# Patient Record
Sex: MALE | Race: WHITE | NOT HISPANIC OR LATINO | Employment: OTHER | URBAN - METROPOLITAN AREA
[De-identification: names, ages, dates, MRNs, and addresses within clinical notes are randomized per-mention and may not be internally consistent; named-entity substitution may affect disease eponyms.]

---

## 2017-01-12 ENCOUNTER — OFFICE VISIT (OUTPATIENT)
Dept: GASTROENTEROLOGY | Facility: CLINIC | Age: 77
End: 2017-01-12

## 2017-01-12 VITALS — HEIGHT: 66 IN | WEIGHT: 166.6 LBS | BODY MASS INDEX: 26.78 KG/M2

## 2017-01-12 DIAGNOSIS — K59.09 OTHER CONSTIPATION: Primary | ICD-10-CM

## 2017-01-12 PROCEDURE — 99214 OFFICE O/P EST MOD 30 MIN: CPT | Performed by: INTERNAL MEDICINE

## 2017-01-12 NOTE — PROGRESS NOTES
Chief Complaint   Patient presents with   • Abdominal Pain   • Constipation       Festus Mcneill is a  76 y.o. male here for a follow up visit for constipation.    HPI    Patient 76-year-old male with history of arthritis colon resection for diverticulitis GERD and internal hemorrhoids.  Patient had a sigmoid resection with diverticulitis and abscess in May 2015.  Patient reports afterwards progressive constipation issues with abdominal pain and borborygmi.  Patient reports constipation never seems to truly improve.  Patient will take enough medication to cause diarrhea which gives him some relief but never feels like he is completely relieved.  Patient now for further evaluation.    Past Medical History   Diagnosis Date   • Arthritis    • Diverticulitis    • Diverticulosis    • GERD (gastroesophageal reflux disease)    • Internal hemorrhoids    • Small bowel obstruction          Current Outpatient Prescriptions:   •  esomeprazole (NexIUM) 40 MG capsule, Take 40 mg by mouth every morning before breakfast., Disp: , Rfl:   •  hydrochlorothiazide (HYDRODIURIL) 25 MG tablet, Take 25 mg by mouth daily., Disp: , Rfl:   •  meloxicam (MOBIC) 15 MG tablet, Take 15 mg by mouth daily., Disp: , Rfl:   •  Multiple Vitamin (MULTI VITAMIN DAILY PO), Take  by mouth., Disp: , Rfl:   •  econazole nitrate (SPECTAZOLE) 1 % cream, Apply  topically daily., Disp: , Rfl:   •  hyoscyamine (LEVSIN) 0.125 MG SL tablet, Take 1 tablet by mouth 4 (four) times a day with meals and nightly., Disp: 120 tablet, Rfl: 5  •  linaclotide (LINZESS) 290 MCG capsule capsule, Take 290 mcg by mouth Daily As Needed (constipation)., Disp: 30 capsule, Rfl: 11  •  mupirocin (BACTROBAN) 2 % ointment, Apply  topically 3 (three) times a day., Disp: , Rfl:     Allergies   Allergen Reactions   • Ciprofloxacin    • Penicillins        Social History     Social History   • Marital status:      Spouse name: N/A   • Number of children: N/A   • Years of  education: N/A     Occupational History   • Not on file.     Social History Main Topics   • Smoking status: Never Smoker   • Smokeless tobacco: Not on file   • Alcohol use No   • Drug use: No   • Sexual activity: Not on file     Other Topics Concern   • Not on file     Social History Narrative       Family History   Problem Relation Age of Onset   • Colon cancer Mother        Review of Systems   Constitutional: Negative.    Respiratory: Negative.    Cardiovascular: Negative.    Gastrointestinal: Negative.    Endocrine: Negative.    Musculoskeletal: Negative.    Skin: Negative.        There were no vitals filed for this visit.    Physical Exam   Constitutional: He is oriented to person, place, and time. He appears well-developed and well-nourished.   HENT:   Head: Normocephalic and atraumatic.   Eyes: EOM are normal. Pupils are equal, round, and reactive to light.   Cardiovascular: Normal rate, regular rhythm and normal heart sounds.    Pulmonary/Chest: Effort normal.   Abdominal: Soft. Bowel sounds are normal. He exhibits no distension and no mass. There is no tenderness. No hernia.   Musculoskeletal: Normal range of motion.   Neurological: He is alert and oriented to person, place, and time.   Skin: Skin is dry.   Psychiatric: He has a normal mood and affect. His behavior is normal. Judgment and thought content normal.       No visits with results within 2 Month(s) from this visit.  Latest known visit with results is:    Admission on 05/05/2015, Discharged on 05/19/2015   No results displayed because visit has over 200 results.          Festus was seen today for abdominal pain and constipation.    Diagnoses and all orders for this visit:    Other constipation  -     Case Request; Standing  -     Case Request    Other orders  -     Implement Anesthesia orders day of procedure.; Standing  -     Obtain informed consent; Standing  -     linaclotide (LINZESS) 290 MCG capsule capsule; Take 290 mcg by mouth Daily As  Needed (constipation).      Patient reports chronic constipation since his sigmoid resection.  Patient postop had some ileus but otherwise was unremarkable now almost 2 years postop patient's constipation bleeds to either severe stool retention or diarrhea if enough meds are taken.  Concern is for possible anastomotic stenosis.  At this point would recommend sigmoidoscopy to evaluate the anastomosis and consider further therapy.  At this point patient reports the Linzess was best which gave him diarrhea and some relief when he did go to the bathroom.  We'll continue on the Linzess as needed and take the sigmoidoscopy when available for further recommendations.

## 2017-01-12 NOTE — MR AVS SNAPSHOT
Festus Mcneill   1/12/2017 2:00 PM   Office Visit    Dept Phone:  [phone]   Encounter #:  [id number]    Provider:  Foster Engel MD   Department:  [facility] GASTROENTEROLOGY                Your Full Care Plan              Today's Medication Changes          These changes are accurate as of: 1/12/17  3:15 PM.  If you have any questions, ask your nurse or doctor.               New Medication(s)Ordered:     linaclotide 290 MCG capsule capsule   Commonly known as:  LINZESS   Take 290 mcg by mouth Daily As Needed (constipation).   Started by:  Foster Engel MD         Stop taking medication(s)listed here:     lubiprostone 24 MCG capsule   Commonly known as:  AMITIZA   Stopped by:  Foster Engel MD                Where to Get Your Medications      These medications were sent to [facility] [address] [place] [address] [address] [facility] AT [facility] - [phone] [facility] [phone] [address] [address]     Phone:  [phone]     linaclotide 290 MCG capsule capsule                  Your Updated Medication List          This list is accurate as of: 1/12/17  3:15 PM.  Always use your most recent med list.                econazole nitrate 1 % cream   Commonly known as:  SPECTAZOLE       esomeprazole 40 MG capsule   Commonly known as:  nexIUM       hydrochlorothiazide 25 MG tablet   Commonly known as:  HYDRODIURIL       hyoscyamine 0.125 MG SL tablet   Commonly known as:  LEVSIN   Take 1 tablet by mouth 4 (four) times a day with meals and nightly.       linaclotide 290 MCG capsule capsule   Commonly known as:  LINZESS   Take 290 mcg by mouth Daily As Needed (constipation).       meloxicam 15 MG tablet   Commonly known as:  MOBIC       MULTI VITAMIN DAILY PO       mupirocin 2 % ointment   Commonly known as:  BACTROBAN               We Performed the Following     Case Request       You Were Diagnosed  "With        Codes Comments    Other constipation    -  Primary ICD-10-CM: K59.09  ICD-9-CM: 564.09       Instructions     None    Patient Instructions History      Upcoming Appointments     Visit Type Date Time Department    FOLLOW UP 2017  2:00 PM [facility] [place]      [employer] Signup     [facility] allows you to send messages to your doctor, view your test results, renew your prescriptions, schedule appointments, and more. To sign up, go to [employer] and click on the Sign Up Now link in the New User? box. Enter your [employer] Activation Code exactly as it appears below along with the last four digits of your Social Security Number and your Date of Birth () to complete the sign-up process. If you do not sign up before the expiration date, you must request a new code.    [employer] Activation Code: [email or web address]  Expires: 2017  3:15 PM    If you have questions, you can email [email or web address] or call [phone] to talk to our [employer] staff. Remember, [employer] is NOT to be used for urgent needs. For medical emergencies, dial 911.               Other Info from Your Visit           Allergies     Ciprofloxacin      Penicillins        Reason for Visit     Abdominal Pain     Constipation           Vital Signs     Height Weight Body Mass Index Smoking Status          66\" (167.6 cm) 166 lb 9.6 oz (75.6 kg) 26.89 kg/m2 Never Smoker        Problems and Diagnoses Noted     Constipation    -  Primary        "

## 2017-01-12 NOTE — LETTER
January 12, 2017     TONE Snyder  [address] [facility] [address]    Patient: Festus Mcneill   YOB: 1940   Date of Visit: 1/12/2017       Dear TONE Mott:    Festus Mcneill was in my office today. Below is a copy of my note.    If you have questions, please do not hesitate to call me. I look forward to following Festus along with you.         Sincerely,        Foster Engel MD        CC: No Known Provider    Chief Complaint   Patient presents with   • Abdominal Pain   • Constipation       Festus Mcneill is a  76 y.o. male here for a follow up visit for constipation.    HPI    Patient 76-year-old male with history of arthritis colon resection for diverticulitis GERD and internal hemorrhoids.  Patient had a sigmoid resection with diverticulitis and abscess in May 2015.  Patient reports afterwards progressive constipation issues with abdominal pain and borborygmi.  Patient reports constipation never seems to truly improve.  Patient will take enough medication to cause diarrhea which gives him some relief but never feels like he is completely relieved.  Patient now for further evaluation.    Past Medical History   Diagnosis Date   • Arthritis    • Diverticulitis    • Diverticulosis    • GERD (gastroesophageal reflux disease)    • Internal hemorrhoids    • Small bowel obstruction          Current Outpatient Prescriptions:   •  esomeprazole (NexIUM) 40 MG capsule, Take 40 mg by mouth every morning before breakfast., Disp: , Rfl:   •  hydrochlorothiazide (HYDRODIURIL) 25 MG tablet, Take 25 mg by mouth daily., Disp: , Rfl:   •  meloxicam (MOBIC) 15 MG tablet, Take 15 mg by mouth daily., Disp: , Rfl:   •  Multiple Vitamin (MULTI VITAMIN DAILY PO), Take  by mouth., Disp: , Rfl:   •  econazole nitrate (SPECTAZOLE) 1 % cream, Apply  topically daily., Disp: , Rfl:   •  hyoscyamine (LEVSIN) 0.125 MG SL tablet, Take 1 tablet by mouth 4 (four) times a day with meals and  nightly., Disp: 120 tablet, Rfl: 5  •  linaclotide (LINZESS) 290 MCG capsule capsule, Take 290 mcg by mouth Daily As Needed (constipation)., Disp: 30 capsule, Rfl: 11  •  mupirocin (BACTROBAN) 2 % ointment, Apply  topically 3 (three) times a day., Disp: , Rfl:     Allergies   Allergen Reactions   • Ciprofloxacin    • Penicillins        Social History     Social History   • Marital status:      Spouse name: N/A   • Number of children: N/A   • Years of education: N/A     Occupational History   • Not on file.     Social History Main Topics   • Smoking status: Never Smoker   • Smokeless tobacco: Not on file   • Alcohol use No   • Drug use: No   • Sexual activity: Not on file     Other Topics Concern   • Not on file     Social History Narrative       Family History   Problem Relation Age of Onset   • Colon cancer Mother        Review of Systems   Constitutional: Negative.    Respiratory: Negative.    Cardiovascular: Negative.    Gastrointestinal: Negative.    Endocrine: Negative.    Musculoskeletal: Negative.    Skin: Negative.        There were no vitals filed for this visit.    Physical Exam   Constitutional: He is oriented to person, place, and time. He appears well-developed and well-nourished.   HENT:   Head: Normocephalic and atraumatic.   Eyes: EOM are normal. Pupils are equal, round, and reactive to light.   Cardiovascular: Normal rate, regular rhythm and normal heart sounds.    Pulmonary/Chest: Effort normal.   Abdominal: Soft. Bowel sounds are normal. He exhibits no distension and no mass. There is no tenderness. No hernia.   Musculoskeletal: Normal range of motion.   Neurological: He is alert and oriented to person, place, and time.   Skin: Skin is dry.   Psychiatric: He has a normal mood and affect. His behavior is normal. Judgment and thought content normal.       No visits with results within 2 Month(s) from this visit.  Latest known visit with results is:    Admission on 05/05/2015, Discharged on  05/19/2015   No results displayed because visit has over 200 results.          Festus was seen today for abdominal pain and constipation.    Diagnoses and all orders for this visit:    Other constipation  -     Case Request; Standing  -     Case Request    Other orders  -     Implement Anesthesia orders day of procedure.; Standing  -     Obtain informed consent; Standing  -     linaclotide (LINZESS) 290 MCG capsule capsule; Take 290 mcg by mouth Daily As Needed (constipation).      Patient reports chronic constipation since his sigmoid resection.  Patient postop had some ileus but otherwise was unremarkable now almost 2 years postop patient's constipation bleeds to either severe stool retention or diarrhea if enough meds are taken.  Concern is for possible anastomotic stenosis.  At this point would recommend sigmoidoscopy to evaluate the anastomosis and consider further therapy.  At this point patient reports the Linzess was best which gave him diarrhea and some relief when he did go to the bathroom.  We'll continue on the Linzess as needed and take the sigmoidoscopy when available for further recommendations.

## 2017-01-23 ENCOUNTER — ANESTHESIA EVENT (OUTPATIENT)
Dept: GASTROENTEROLOGY | Facility: HOSPITAL | Age: 77
End: 2017-01-23

## 2017-01-23 ENCOUNTER — HOSPITAL ENCOUNTER (OUTPATIENT)
Facility: HOSPITAL | Age: 77
Setting detail: HOSPITAL OUTPATIENT SURGERY
Discharge: HOME | End: 2017-01-23
Attending: INTERNAL MEDICINE | Admitting: INTERNAL MEDICINE

## 2017-01-23 ENCOUNTER — ANESTHESIA (OUTPATIENT)
Dept: GASTROENTEROLOGY | Facility: HOSPITAL | Age: 77
End: 2017-01-23

## 2017-01-23 VITALS
SYSTOLIC BLOOD PRESSURE: 145 MMHG | RESPIRATION RATE: 16 BRPM | BODY MASS INDEX: 26.68 KG/M2 | DIASTOLIC BLOOD PRESSURE: 86 MMHG | HEART RATE: 70 BPM | HEIGHT: 66 IN | WEIGHT: 166 LBS | OXYGEN SATURATION: 95 %

## 2017-01-23 PROCEDURE — 25010000002 PROPOFOL 10 MG/ML EMULSION

## 2017-01-23 PROCEDURE — 45330 DIAGNOSTIC SIGMOIDOSCOPY: CPT | Performed by: INTERNAL MEDICINE

## 2017-01-23 RX ORDER — LIDOCAINE HYDROCHLORIDE 20 MG/ML
INJECTION, SOLUTION INFILTRATION; PERINEURAL AS NEEDED
Status: DISCONTINUED | OUTPATIENT
Start: 2017-01-23 | End: 2017-01-23 | Stop reason: SURG

## 2017-01-23 RX ORDER — SODIUM CHLORIDE, SODIUM LACTATE, POTASSIUM CHLORIDE, CALCIUM CHLORIDE 600; 310; 30; 20 MG/100ML; MG/100ML; MG/100ML; MG/100ML
30 INJECTION, SOLUTION INTRAVENOUS CONTINUOUS PRN
Status: DISCONTINUED | OUTPATIENT
Start: 2017-01-23 | End: 2017-01-23 | Stop reason: HOSPADM

## 2017-01-23 RX ORDER — PROPOFOL 10 MG/ML
VIAL (ML) INTRAVENOUS AS NEEDED
Status: DISCONTINUED | OUTPATIENT
Start: 2017-01-23 | End: 2017-01-23 | Stop reason: SURG

## 2017-01-23 RX ORDER — PROPOFOL 10 MG/ML
VIAL (ML) INTRAVENOUS CONTINUOUS PRN
Status: DISCONTINUED | OUTPATIENT
Start: 2017-01-23 | End: 2017-01-23 | Stop reason: SURG

## 2017-01-23 RX ADMIN — PROPOFOL 140 MCG/KG/MIN: 10 INJECTION, EMULSION INTRAVENOUS at 14:03

## 2017-01-23 RX ADMIN — SODIUM CHLORIDE, POTASSIUM CHLORIDE, SODIUM LACTATE AND CALCIUM CHLORIDE 30 ML/HR: 600; 310; 30; 20 INJECTION, SOLUTION INTRAVENOUS at 13:18

## 2017-01-23 RX ADMIN — PROPOFOL 60 MG: 10 INJECTION, EMULSION INTRAVENOUS at 14:05

## 2017-01-23 RX ADMIN — LIDOCAINE HYDROCHLORIDE 40 MG: 20 INJECTION, SOLUTION INFILTRATION; PERINEURAL at 14:03

## 2017-01-23 NOTE — IP AVS SNAPSHOT
AFTER VISIT SUMMARY             Festus Mcneill           About your hospitalization     You were admitted on:  January 23, 2017 You last received care in the:  [facility] END[facility]       Procedures & Surgeries      Procedure(s) (LRB):  SIGMOIDOSCOPY FLEXIBLE  TO LEFT TRANSVERSE COLON (N/A)     1/23/2017     Surgeon(s):  Patsy Gallegos MD  -------------------      Medications    If you or your caregiver advised us that you are currently taking a medication and that medication is marked below as “Resume”, this simply indicates that we have reviewed those medications to make sure our new therapy recommendations do not interfere.  If you have concerns about medications other than those new ones which we are prescribing today, please consult the physician who prescribed them (or your primary physician).  Our review of your home medications is not meant to indicate that we are directing their use.             Your Medications      CONTINUE taking these medications     econazole nitrate 1 % cream   Apply  topically daily.   Commonly known as:  SPECTAZOLE           esomeprazole 40 MG capsule   Take 40 mg by mouth every morning before breakfast.   Commonly known as:  nexIUM           hydrochlorothiazide 25 MG tablet   Take 25 mg by mouth daily.   Commonly known as:  HYDRODIURIL           hyoscyamine 0.125 MG SL tablet   Take 1 tablet by mouth 4 (four) times a day with meals and nightly.   Commonly known as:  LEVSIN           linaclotide 290 MCG capsule capsule   Take 290 mcg by mouth Daily As Needed (constipation).   Commonly known as:  LINZESS           meloxicam 15 MG tablet   Take 15 mg by mouth daily.   Commonly known as:  MOBIC           MULTI VITAMIN DAILY PO   Take  by mouth.           mupirocin 2 % ointment   Apply  topically 3 (three) times a day.   Commonly known as:  BACTROBAN                      Your Medications      Your Medication List           Morning Noon Evening Bedtime As  Needed    econazole nitrate 1 % cream   Apply  topically daily.   Commonly known as:  SPECTAZOLE                                esomeprazole 40 MG capsule   Take 40 mg by mouth every morning before breakfast.   Commonly known as:  nexIUM                                hydrochlorothiazide 25 MG tablet   Take 25 mg by mouth daily.   Commonly known as:  HYDRODIURIL                                hyoscyamine 0.125 MG SL tablet   Take 1 tablet by mouth 4 (four) times a day with meals and nightly.   Commonly known as:  LEVSIN                                linaclotide 290 MCG capsule capsule   Take 290 mcg by mouth Daily As Needed (constipation).   Commonly known as:  LINZESS                                meloxicam 15 MG tablet   Take 15 mg by mouth daily.   Commonly known as:  MOBIC                                MULTI VITAMIN DAILY PO   Take  by mouth.                                mupirocin 2 % ointment   Apply  topically 3 (three) times a day.   Commonly known as:  BACTROBAN                                         Instructions for After Discharge        Discharge References/Attachments     DIVERTICULOSIS (ENGLISH)    FLEXIBLE SIGMOIDOSCOPY, CARE AFTER (ENGLISH)    LINACLOTIDE ORAL CAPSULES (ENGLISH)    HEMORRHOIDS, EASY-TO-READ (ENGLISH)       Follow-ups for After Discharge        SureDone Signup     [facility] SureDone allows you to send messages to your doctor, view your test results, renew your prescriptions, schedule appointments, and more. To sign up, go to [employer] and click on the Sign Up Now link in the New User? box. Enter your SureDone Activation Code exactly as it appears below along with the last four digits of your Social Security Number and your Date of Birth () to complete the sign-up process. If you do not sign up before the expiration date, you must request a new code.    SureDone Activation Code: [email or web address]  Expires: 2017  3:15 PM    If you have questions, you can  email [email or web address] or call [phone] to talk to our MyChart staff. Remember, MyChart is NOT to be used for urgent needs. For medical emergencies, dial 911.           Summary of Your Hospitalization        Reason for Hospitalization     Your primary diagnosis was:  Not on File      Care Providers     Provider Service Role Specialty    Foster Engel MD -- Attending Provider Gastroenterology    Foster Engel MD -- Surgeon  Gastroenterology    Patsy Gallegos MD -- Surgeon  Gastroenterology      Your Allergies  Date Reviewed: 1/23/2017    Allergen Reactions    Ciprofloxacin Not Noted         Penicillins Not Noted      Patient Belongings Returned     Document Return of Belongings Flowsheet     Were the patient bedside belongings sent home?   --   Belongings Retrieved from Security & Sent Home   --    Belongings Sent to Safe   --   Medications Retrieved from Pharmacy & Sent Home   --              More Information      Diverticulosis  Diverticulosis is the condition that develops when small pouches (diverticula) form in the wall of your colon. Your colon, or large intestine, is where water is absorbed and stool is formed. The pouches form when the inside layer of your colon pushes through weak spots in the outer layers of your colon.  CAUSES   No one knows exactly what causes diverticulosis.  RISK FACTORS  · Being older than 50. Your risk for this condition increases with age. Diverticulosis is rare in people younger than 40 years. By age 80, almost everyone has it.  · Eating a low-fiber diet.  · Being frequently constipated.  · Being overweight.  · Not getting enough exercise.  · Smoking.  · Taking over-the-counter pain medicines, like aspirin and ibuprofen.  SYMPTOMS   Most people with diverticulosis do not have symptoms.  DIAGNOSIS   Because diverticulosis often has no symptoms, health care providers often discover the condition during an exam for other colon problems. In many cases, a  health care provider will diagnose diverticulosis while using a flexible scope to examine the colon (colonoscopy).  TREATMENT   If you have never developed an infection related to diverticulosis, you may not need treatment. If you have had an infection before, treatment may include:  · Eating more fruits, vegetables, and grains.  · Taking a fiber supplement.  · Taking a live bacteria supplement (probiotic).  · Taking medicine to relax your colon.  HOME CARE INSTRUCTIONS   · Drink at least 6-8 glasses of water each day to prevent constipation.  · Try not to strain when you have a bowel movement.  · Keep all follow-up appointments.  If you have had an infection before:   · Increase the fiber in your diet as directed by your health care provider or dietitian.  · Take a dietary fiber supplement if your health care provider approves.  · Only take medicines as directed by your health care provider.  SEEK MEDICAL CARE IF:   · You have abdominal pain.  · You have bloating.  · You have cramps.  · You have not gone to the bathroom in 3 days.  SEEK IMMEDIATE MEDICAL CARE IF:   · Your pain gets worse.  · Your bloating becomes very bad.  · You have a fever or chills, and your symptoms suddenly get worse.  · You begin vomiting.  · You have bowel movements that are bloody or black.  MAKE SURE YOU:  · Understand these instructions.  · Will watch your condition.  · Will get help right away if you are not doing well or get worse.     This information is not intended to replace advice given to you by your health care provider. Make sure you discuss any questions you have with your health care provider.     Document Released: 09/14/2005 Document Revised: 12/23/2014 Document Reviewed: 11/12/2014  [employer] Interactive Patient Education ©2016 [employer]          Flexible Sigmoidoscopy, Care After  Follow up appointment with  in 4 weeks, call [phone] for schedule.  Refer to this sheet in the next few weeks. These instructions  provide you with information on caring for yourself after your procedure. Your health care provider may also give you more specific instructions. Your treatment has been planned according to current medical practices, but problems sometimes occur. Call your health care provider if you have any problems or questions after your procedure.  WHAT TO EXPECT AFTER THE PROCEDURE  After your procedure, it is typical to have the following:   · Abdominal cramps.  · Bloating.  · A small amount of rectal bleeding if you had a biopsy.  HOME CARE INSTRUCTIONS  · Only take over-the-counter or prescription medicines for pain, fever, or discomfort as directed by your health care provider.  · Resume your normal diet and activities as directed by your health care provider.  SEEK MEDICAL CARE IF:  · You have abdominal pain or cramping that lasts longer than 1 hour after the procedure.  · You continue to have small amounts of rectal bleeding after 24 hours.  · You have nausea or vomiting.  · You feel weak or dizzy.  SEEK IMMEDIATE MEDICAL CARE IF:   · You have a fever.  · You pass large blood clots or see a large amount of blood in the toilet after having a bowel movement. This may also occur 10-14 days after the procedure. It is more likely if you had a biopsy.  · You develop abdominal pain that is not relieved with medicine or your abdominal pain gets worse.  · You have nausea or vomiting for more than 24 hours after the procedure.     This information is not intended to replace advice given to you by your health care provider. Make sure you discuss any questions you have with your health care provider.     Document Released: 12/23/2014 Document Reviewed: 12/23/2014  [employer] Interactive Patient Education ©2016 [employer]          Linaclotide oral capsules  What is this medicine?  LINACLOTIDE (ben a KLOE tide) is used to treat irritable bowel syndrome (IBS) with constipation as the main problem. It may also be used for relief of  chronic constipation.  This medicine may be used for other purposes; ask your health care provider or pharmacist if you have questions.  What should I tell my health care provider before I take this medicine?  They need to know if you have any of these conditions:  -history of stool (fecal) impaction  -now have diarrhea or have diarrhea often  -other medical condition  -stomach or intestinal disease, including bowel obstruction or abdominal adhesions  -an unusual or allergic reaction to linaclotide, other medicines, foods, dyes, or preservatives  -pregnant or trying to get pregnant  -breast-feeding  How should I use this medicine?  Take this medicine by mouth with a glass of water. Follow the directions on the prescription label. Do not cut, crush or chew this medicine. Take on an empty stomach, at least 30 minutes before your first meal of the day. Take your medicine at regular intervals. Do not take your medicine more often than directed. Do not stop taking except on your doctor's advice.  A special MedGuide will be given to you by the pharmacist with each prescription and refill. Be sure to read this information carefully each time.  Talk to your pediatrician regarding the use of this medicine in children. This medicine is not approved for use in children.  Overdosage: If you think you have taken too much of this medicine contact a poison control center or emergency room at once.  NOTE: This medicine is only for you. Do not share this medicine with others.  What if I miss a dose?  If you miss a dose, just skip that dose. Wait until your next dose, and take only that dose. Do not take double or extra doses.  What may interact with this medicine?  -certain medicines for bowel problems or bladder incontinence (these can cause constipation)  This list may not describe all possible interactions. Give your health care provider a list of all the medicines, herbs, non-prescription drugs, or dietary supplements you use.  Also tell them if you smoke, drink alcohol, or use illegal drugs. Some items may interact with your medicine.  What should I watch for while using this medicine?  Visit your doctor for regular check ups. Tell your doctor if your symptoms do not get better or if they get worse.  Diarrhea is a common side effect of this medicine. It often begins within 2 weeks of starting this medicine. Stop taking this medicine and call your doctor if you get severe diarrhea.  Stop taking this medicine and call your doctor or go to the nearest hospital emergency room right away if you develop unusual or severe stomach-area (abdominal) pain, especially if you also have bright red, bloody stools or black stools that look like tar.  What side effects may I notice from receiving this medicine?  Side effects that you should report to your doctor or health care professional as soon as possible:  -allergic reactions like skin rash, itching or hives, swelling of the face, lips, or tongue  -black, tarry stools  -bloody or watery diarrhea  -new or worsening stomach pain  -severe or prolonged diarrhea  Side effects that usually do not require medical attention (Report these to your doctor or health care professional if they continue or are bothersome.):  -bloating  -gas  -loose stools  This list may not describe all possible side effects. Call your doctor for medical advice about side effects. You may report side effects to FDA at 7-118-FDA-1754.  Where should I keep my medicine?  Keep out of the reach of children.  Store at room temperature between 20 and 25 degrees C (68 and 77 degrees F). Keep this medicine in the original container. Keep tightly closed in a dry place. Do not remove the desiccant packet from the bottle, it helps to protect your medicine from moisture. Throw away any unused medicine after the expiration date.  NOTE: This sheet is a summary. It may not cover all possible information. If you have questions about this medicine,  talk to your doctor, pharmacist, or health care provider.     © 2016, Elsevier/Gold Standard. (2012-09-04 13:05:27)          Hemorrhoids  Hemorrhoids are puffy (swollen) veins around the rectum or anus. Hemorrhoids can cause pain, itching, bleeding, or irritation.  HOME CARE  · Eat foods with fiber, such as whole grains, beans, nuts, fruits, and vegetables. Ask your doctor about taking products with added fiber in them (fiber supplements).   · Drink enough fluid to keep your pee (urine) clear or pale yellow.  · Exercise often.  · Go to the bathroom when you have the urge to poop. Do not wait.  · Avoid straining to poop (bowel movement).  · Keep the butt area dry and clean. Use wet toilet paper or moist paper towels.  · Medicated creams and medicine inserted into the anus (anal suppository) may be used or applied as told.  · Only take medicine as told by your doctor.  · Take a warm water bath (sitz bath) for 15-20 minutes to ease pain. Do this 3-4 times a day.  · Place ice packs on the area if it is tender or puffy. Use the ice packs between the warm water baths.    Put ice in a plastic bag.    Place a towel between your skin and the bag.    Leave the ice on for 15-20 minutes, 03-04 times a day.  · Do not use a donut-shaped pillow or sit on the toilet for a long time.  GET HELP RIGHT AWAY IF:   · You have more pain that is not controlled by treatment or medicine.  · You have bleeding that will not stop.  · You have trouble or are unable to poop (bowel movement).  · You have pain or puffiness outside the area of the hemorrhoids.  MAKE SURE YOU:   · Understand these instructions.  · Will watch your condition.  · Will get help right away if you are not doing well or get worse.     This information is not intended to replace advice given to you by your health care provider. Make sure you discuss any questions you have with your health care provider.     Document Released: 09/26/2009 Document Revised: 12/04/2013 Document  Reviewed: 10/29/2013  [employer] Interactive Patient Education ©2016 [employer].            SYMPTOMS OF A STROKE    Call 911 or have someone take you to the Emergency Department if you have any of the following:    · Sudden numbness or weakness of your face, arm or leg especially on one side of the body  · Sudden confusion, diffiiculty speaking or trouble understanding   · Changes in your vision or loss of sight in one eye  · Sudden severe headache with no known cause  · sudden dizziness, trouble walking, loss of balance or coordination    It is important to seek emergency care right away if you have further stroke symptoms. If you get emergency help quickly, the powerful clot-dissolving medicines can reduce the disabilities caused by a stroke.     For more information:    American Stroke Association  9-183-4-STROKE  www.strokeassociation.org           IF YOU SMOKE OR USE TOBACCO PLEASE READ THE FOLLOWING:    Why is smoking bad for me?  Smoking increases the risk of heart disease, lung disease, vascular disease, stroke, and cancer.     If you smoke, STOP!    If you would like more information on quitting smoking, please visit the [employer] website: [email or web address]   This link will provide additional resources including the QUIT line and the Beat the Pack support groups.     For more information:    American Cancer Society  ([phone] [phone]    American Heart Association  1-889.711.6108               YOU ARE THE MOST IMPORTANT FACTOR IN YOUR RECOVERY.     Follow all instructions carefully.     I have reviewed my discharge instructions with my nurse, including the following information, if applicable:     Information about my illness and diagnosis   Follow up appointments (including lab draws)   Wound Care   Equipment Needs   Medications (new and continuing) along with side effects   Preventative information such as vaccines and smoking cessations   Diet   Pain   I  know when to contact my Doctor's office or seek emergency care      I want my nurse to describe the side effects of my medications: YES NO   If the answer is no, I understand the side effects of my medications: YES NO   My nurse described the side effects of my medications in a way that I could understand: YES NO   I have taken my personal belongings and my own medications with me at discharge: YES NO            I have received this information and my questions have been answered. I have discussed any concerns I see with this plan with the nurse or physician. I understand these instructions.    Signature of Patient or Responsible Person: _____________________________________    Date: _________________  Time: __________________    Signature of Healthcare Provider: _______________________________________  Date: _________________  Time: __________________

## 2017-01-23 NOTE — ANESTHESIA PREPROCEDURE EVALUATION
Anesthesia Evaluation     Patient summary reviewed and Nursing notes reviewed    Airway   Mallampati: II  Dental - normal exam     Pulmonary - negative pulmonary ROS and normal exam   Cardiovascular - normal exam  (+) hypertension,     ECG reviewed  Rhythm: regular  Rate: normal    Neuro/Psych- negative ROS  GI/Hepatic/Renal/Endo    (+)  GERD,     Musculoskeletal     Abdominal    Substance History - negative use     OB/GYN          Other   (+) arthritis                        Anesthesia Plan    ASA 2     MAC     intravenous induction   Anesthetic plan and risks discussed with patient.

## 2017-01-23 NOTE — H&P
[facility]  Pre Procedure History & Physical    Chief Complaint:   Constipation, hx sigmoid resection    Subjective     HPI:   Patient 76-year-old male with history of arthritis colon resection for diverticulitis GERD and internal hemorrhoids. Patient had a sigmoid resection with diverticulitis and abscess in May 2015. Patient reports afterwards progressive constipation issues with abdominal pain and borborygmi. Patient reports constipation never seems to truly improve. Patient will take enough medication to cause diarrhea which gives him some relief but never feels like he is completely relieved. Patient now for further evaluation.    Past Medical History:   Past Medical History   Diagnosis Date   • Arthritis    • Diverticulitis    • Diverticulosis    • GERD (gastroesophageal reflux disease)    • Hypertension    • Internal hemorrhoids    • Small bowel obstruction    • Vocal cord disease      VOCAL CORD TUMOR - CUT NERVE, NOW SPEAKS WITH HOARSENESS       Past Surgical History:  Past Surgical History   Procedure Laterality Date   • Endoscopy  02/11/2015     erythematous mucosa in antrum, no h-pylori   • Cataract extraction     • Joint replacement     • Prostate biopsy     • Cholecystectomy     • Colonoscopy  02/11/2015     adenomatous polyp w/low grade dysplasia, NBIH, tics   • Colon resection       DIVERTICULITIS    • Below knee leg amputation Right        Family History:  Family History   Problem Relation Age of Onset   • Colon cancer Mother        Social History:   reports that he has never smoked. He does not have any smokeless tobacco history on file. He reports that he does not drink alcohol or use illicit drugs.    Medications:   Prescriptions Prior to Admission   Medication Sig Dispense Refill Last Dose   • esomeprazole (NexIUM) 40 MG capsule Take 40 mg by mouth every morning before breakfast.   1/22/2017 at Unknown time   • hydrochlorothiazide (HYDRODIURIL) 25 MG tablet Take 25 mg by  "mouth daily.   1/23/2017 at Unknown time   • linaclotide (LINZESS) 290 MCG capsule capsule Take 290 mcg by mouth Daily As Needed (constipation). 30 capsule 11 Past Week at Unknown time   • meloxicam (MOBIC) 15 MG tablet Take 15 mg by mouth daily.   1/22/2017 at Unknown time   • Multiple Vitamin (MULTI VITAMIN DAILY PO) Take  by mouth.   1/22/2017 at Unknown time   • econazole nitrate (SPECTAZOLE) 1 % cream Apply  topically daily.   More than a month at Unknown time   • hyoscyamine (LEVSIN) 0.125 MG SL tablet Take 1 tablet by mouth 4 (four) times a day with meals and nightly. 120 tablet 5 Unknown at Unknown time   • mupirocin (BACTROBAN) 2 % ointment Apply  topically 3 (three) times a day.   More than a month at Unknown time       Allergies:  Ciprofloxacin and Penicillins    ROS:    Pertinent items are noted in HPI, all other systems reviewed and negative     Objective     Blood pressure 121/91, pulse 84, resp. rate 18, height 66\" (167.6 cm), weight 166 lb (75.3 kg), SpO2 96 %.    Physical Exam   Constitutional: Pt is oriented to person, place, and time and well-developed, well-nourished, and in no distress.   Mouth/Throat: Oropharynx is clear and moist.   Neck: Normal range of motion.   Cardiovascular: Normal rate, regular rhythm and normal heart sounds.    Pulmonary/Chest: Effort normal and breath sounds normal.   Abdominal: Soft. Nontender  Skin: Skin is warm and dry.   Psychiatric: Mood, memory, affect and judgment normal.     Assessment/Plan     Diagnosis:  Constipation, sigmoid resection    Anticipated Surgical Procedure:  Flex sig - evaluate sigmoid anastomosis for stricture    The risks, benefits, and alternatives of this procedure have been discussed with the patient or the responsible party- the patient understands and agrees to proceed.                                                            "

## 2017-01-23 NOTE — PLAN OF CARE
Problem: Patient Care Overview (Adult)  Goal: Plan of Care Review  Outcome: Ongoing (interventions implemented as appropriate)    01/23/17 1250   Coping/Psychosocial Response Interventions   Plan Of Care Reviewed With patient   Patient Care Overview   Progress no change       Goal: Adult Individualization and Mutuality  Outcome: Ongoing (interventions implemented as appropriate)  Goal: Discharge Needs Assessment  Outcome: Ongoing (interventions implemented as appropriate)    01/23/17 1250   Discharge Needs Assessment   Concerns To Be Addressed no discharge needs identified         Problem: GI Endoscopy (Adult)  Goal: Signs and Symptoms of Listed Potential Problems Will be Absent or Manageable (GI Endoscopy)  Outcome: Ongoing (interventions implemented as appropriate)    01/23/17 1250   GI Endoscopy   Problems Assessed (GI Endoscopy) all   Problems Present (GI Endoscopy) none

## 2017-01-23 NOTE — ANESTHESIA POSTPROCEDURE EVALUATION
Patient: Festus Mcneill    Procedure Summary     Date Anesthesia Start Anesthesia Stop Room / Location    01/23/17 1358 1415  [facility]       Procedure Diagnosis Surgeon Provider    SIGMOIDOSCOPY FLEXIBLE  TO LEFT TRANSVERSE COLON (N/A ) Other constipation  (Other constipation [K59.09]) MD Prachi Montalvo MD          Anesthesia Type: MAC  Last vitals  /77 (01/23/17 1415)    Temp      Pulse 85 (01/23/17 1415)   Resp 16 (01/23/17 1415)    SpO2 96 % (01/23/17 1415)      Post Anesthesia Care and Evaluation    Patient location during evaluation: bedside  Patient participation: complete - patient participated  Level of consciousness: awake  Pain management: adequate  Airway patency: patent  Anesthetic complications: No anesthetic complications    Cardiovascular status: acceptable  Respiratory status: acceptable  Hydration status: acceptable

## 2017-02-21 ENCOUNTER — OFFICE VISIT (OUTPATIENT)
Dept: GASTROENTEROLOGY | Facility: CLINIC | Age: 77
End: 2017-02-21

## 2017-02-21 VITALS
HEIGHT: 66 IN | BODY MASS INDEX: 26.68 KG/M2 | WEIGHT: 166 LBS | SYSTOLIC BLOOD PRESSURE: 122 MMHG | DIASTOLIC BLOOD PRESSURE: 80 MMHG

## 2017-02-21 DIAGNOSIS — K59.04 CHRONIC IDIOPATHIC CONSTIPATION: Primary | ICD-10-CM

## 2017-02-21 PROCEDURE — 99213 OFFICE O/P EST LOW 20 MIN: CPT | Performed by: INTERNAL MEDICINE

## 2017-02-21 RX ORDER — LUBIPROSTONE 8 UG/1
8 CAPSULE ORAL 2 TIMES DAILY WITH MEALS
Qty: 100 CAPSULE | Refills: 0 | Status: SHIPPED | OUTPATIENT
Start: 2017-02-21 | End: 2017-03-07

## 2017-02-21 NOTE — PROGRESS NOTES
Chief Complaint   Patient presents with   • Follow-up     scope       Festus Mcneill is a  76 y.o. male here for a follow up visit for constpation.    HPI    Patient 76-year-old male with chronic idiopathic constipation status post sigmoid resection for diverticulitis.  Patient is repeatedly tried the Linzess at both no skin of 145 and to 90 with recurrent diarrhea and occasional fecal incontinence despite the dosing.  It does not unfortunately resolve his symptoms and still despite diarrhea has sensation of retained stool.  This point will change to Amitiza trial begin 8 µg twice a day and can increase as needed.  Past Medical History   Diagnosis Date   • Arthritis    • Diverticulitis    • Diverticulosis    • GERD (gastroesophageal reflux disease)    • Hypertension    • Internal hemorrhoids    • Small bowel obstruction    • Vocal cord disease      VOCAL CORD TUMOR - CUT NERVE, NOW SPEAKS WITH HOARSENESS         Current Outpatient Prescriptions:   •  esomeprazole (NexIUM) 40 MG capsule, Take 40 mg by mouth every morning before breakfast., Disp: , Rfl:   •  hydrochlorothiazide (HYDRODIURIL) 25 MG tablet, Take 25 mg by mouth daily., Disp: , Rfl:   •  meloxicam (MOBIC) 15 MG tablet, Take 15 mg by mouth daily., Disp: , Rfl:   •  Multiple Vitamin (MULTI VITAMIN DAILY PO), Take  by mouth., Disp: , Rfl:   •  hyoscyamine (LEVSIN) 0.125 MG SL tablet, Take 1 tablet by mouth 4 (four) times a day with meals and nightly., Disp: 120 tablet, Rfl: 5  •  lubiprostone (AMITIZA) 8 MCG capsule, Take 1 capsule by mouth 2 (Two) Times a Day With Meals., Disp: 100 capsule, Rfl: 0    Allergies   Allergen Reactions   • Ciprofloxacin    • Penicillins        Social History     Social History   • Marital status:      Spouse name: N/A   • Number of children: N/A   • Years of education: N/A     Occupational History   • Not on file.     Social History Main Topics   • Smoking status: Never Smoker   • Smokeless tobacco: Not on file   •  Alcohol use No   • Drug use: No   • Sexual activity: Not on file     Other Topics Concern   • Not on file     Social History Narrative       Family History   Problem Relation Age of Onset   • Colon cancer Mother        Review of Systems   Constitutional: Negative.    Respiratory: Negative.    Cardiovascular: Negative.    Gastrointestinal: Positive for constipation and diarrhea.   Endocrine: Negative.    Musculoskeletal: Negative.    Skin: Negative.        Vitals:    02/21/17 1253   BP: 122/80       Physical Exam   Constitutional: He is oriented to person, place, and time. He appears well-developed and well-nourished.   HENT:   Head: Normocephalic and atraumatic.   Eyes: EOM are normal. Pupils are equal, round, and reactive to light. No scleral icterus.   Cardiovascular: Normal rate, regular rhythm and normal heart sounds.  Exam reveals no gallop and no friction rub.    No murmur heard.  Pulmonary/Chest: Effort normal. He has no wheezes. He has no rales.   Abdominal: Soft. Bowel sounds are normal. He exhibits no distension and no mass. There is no tenderness. No hernia.   Musculoskeletal: Normal range of motion.   Neurological: He is alert and oriented to person, place, and time.   Skin: Skin is dry.   Psychiatric: He has a normal mood and affect. His behavior is normal. Judgment and thought content normal.   Vitals reviewed.      No visits with results within 2 Month(s) from this visit.  Latest known visit with results is:    Admission on 05/05/2015, Discharged on 05/19/2015   No results displayed because visit has over 200 results.          Festus was seen today for follow-up.    Diagnoses and all orders for this visit:    Chronic idiopathic constipation    Other orders  -     lubiprostone (AMITIZA) 8 MCG capsule; Take 1 capsule by mouth 2 (Two) Times a Day With Meals.      Patient with chronic constipation with poor response to Linzess.  Patient with acute diarrhea whenever he takes Linzess, both dosings.  At  this point will change to Amitiza begin with 8 µg twice a day and can increase to 16 mm micrograms twice a day if no effect.  We'll bring back in 2 weeks to adjust.

## 2017-03-07 ENCOUNTER — OFFICE VISIT (OUTPATIENT)
Dept: GASTROENTEROLOGY | Facility: CLINIC | Age: 77
End: 2017-03-07

## 2017-03-07 VITALS
SYSTOLIC BLOOD PRESSURE: 142 MMHG | HEIGHT: 66 IN | WEIGHT: 168.4 LBS | DIASTOLIC BLOOD PRESSURE: 82 MMHG | BODY MASS INDEX: 27.06 KG/M2

## 2017-03-07 DIAGNOSIS — K59.04 CHRONIC IDIOPATHIC CONSTIPATION: Primary | ICD-10-CM

## 2017-03-07 PROCEDURE — 99213 OFFICE O/P EST LOW 20 MIN: CPT | Performed by: INTERNAL MEDICINE

## 2017-03-07 RX ORDER — LUBIPROSTONE 8 UG/1
16 CAPSULE ORAL 2 TIMES DAILY WITH MEALS
Qty: 100 CAPSULE | Refills: 11 | Status: SHIPPED | OUTPATIENT
Start: 2017-03-07 | End: 2017-03-21 | Stop reason: ALTCHOICE

## 2017-03-07 NOTE — PROGRESS NOTES
Chief Complaint   Patient presents with   • Constipation   • Abdominal Pain       Festus Mcneill is a  76 y.o. male here for a follow up visit for constipation.    HPI    Patient with chronic neuropathic constipation reports did try the Amitiza 8 µg twice a day but did not go to the bathroom.  Patient then as directed went to 16 µg twice daily but that led to diarrhea and incontinence.  Patient now for follow-up.  At this point patient stopped the Amitiza went back to the [facility] but again that was not successful in resolving his issues.    Past Medical History   Diagnosis Date   • Arthritis    • Diverticulitis    • Diverticulosis    • GERD (gastroesophageal reflux disease)    • Hypertension    • Internal hemorrhoids    • Small bowel obstruction    • Vocal cord disease      VOCAL CORD TUMOR - CUT NERVE, NOW SPEAKS WITH HOARSENESS         Current Outpatient Prescriptions:   •  esomeprazole (NexIUM) 40 MG capsule, Take 40 mg by mouth every morning before breakfast., Disp: , Rfl:   •  hydrochlorothiazide (HYDRODIURIL) 25 MG tablet, Take 25 mg by mouth daily., Disp: , Rfl:   •  hyoscyamine (LEVSIN) 0.125 MG SL tablet, Take 1 tablet by mouth 4 (four) times a day with meals and nightly., Disp: 120 tablet, Rfl: 5  •  lubiprostone (AMITIZA) 8 MCG capsule, Take 2 capsules by mouth 2 (Two) Times a Day With Meals. Take 2 tablets with morning meal and 1 tablet with evening meal daily, Disp: 100 capsule, Rfl: 11  •  meloxicam (MOBIC) 15 MG tablet, Take 15 mg by mouth daily., Disp: , Rfl:   •  Multiple Vitamin (MULTI VITAMIN DAILY PO), Take  by mouth., Disp: , Rfl:     Allergies   Allergen Reactions   • Ciprofloxacin    • Penicillins        Social History     Social History   • Marital status:      Spouse name: N/A   • Number of children: N/A   • Years of education: N/A     Occupational History   • Not on file.     Social History Main Topics   • Smoking status: Never Smoker   • Smokeless tobacco: Not on file   •  Alcohol use No   • Drug use: No   • Sexual activity: Not on file     Other Topics Concern   • Not on file     Social History Narrative       Family History   Problem Relation Age of Onset   • Colon cancer Mother        Review of Systems   Constitutional: Negative.    Respiratory: Negative.    Cardiovascular: Negative.    Gastrointestinal: Positive for constipation.   Musculoskeletal: Negative.    Skin: Negative.        Vitals:    03/07/17 1420   BP: 142/82       Physical Exam   Constitutional: He is oriented to person, place, and time. He appears well-developed and well-nourished.   HENT:   Head: Normocephalic and atraumatic.   Eyes: EOM are normal. Pupils are equal, round, and reactive to light. No scleral icterus.   Cardiovascular: Normal rate, regular rhythm and normal heart sounds.  Exam reveals no gallop and no friction rub.    No murmur heard.  Pulmonary/Chest: Effort normal. He has no wheezes. He has no rales.   Abdominal: Soft. Bowel sounds are normal. He exhibits no distension and no mass. There is no tenderness. No hernia.   Musculoskeletal: Normal range of motion.   Neurological: He is alert and oriented to person, place, and time.   Skin: Skin is dry.   Psychiatric: He has a normal mood and affect. His behavior is normal.   Vitals reviewed.      No visits with results within 2 Month(s) from this visit.  Latest known visit with results is:    Admission on 05/05/2015, Discharged on 05/19/2015   No results displayed because visit has over 200 results.          Festus was seen today for constipation and abdominal pain.    Diagnoses and all orders for this visit:    Chronic idiopathic constipation    Other orders  -     lubiprostone (AMITIZA) 8 MCG capsule; Take 2 capsules by mouth 2 (Two) Times a Day With Meals. Take 2 tablets with morning meal and 1 tablet with evening meal daily      Patient reported one Amitiza twice a day was not successful moving bowels but when he went to 2 twice a day he was found  to was having diarrhea and overnight incontinence.  At this point will try to 8 µg tablets in the morning and 1, 8 µg tablet at dinner to see if that would be adequate to keep his bowels moving without the nighttime incontinence.  If that's too strong may actually go with just 2 8 µg tablets with breakfast and follow response

## 2017-03-21 ENCOUNTER — OFFICE VISIT (OUTPATIENT)
Dept: GASTROENTEROLOGY | Facility: CLINIC | Age: 77
End: 2017-03-21

## 2017-03-21 VITALS
BODY MASS INDEX: 26.78 KG/M2 | SYSTOLIC BLOOD PRESSURE: 124 MMHG | HEIGHT: 66 IN | WEIGHT: 166.6 LBS | DIASTOLIC BLOOD PRESSURE: 80 MMHG

## 2017-03-21 DIAGNOSIS — K59.04 CHRONIC IDIOPATHIC CONSTIPATION: Primary | ICD-10-CM

## 2017-03-21 PROCEDURE — 99213 OFFICE O/P EST LOW 20 MIN: CPT | Performed by: INTERNAL MEDICINE

## 2017-03-21 RX ORDER — POLYETHYLENE GLYCOL 3350 17 G/17G
9 POWDER, FOR SOLUTION ORAL DAILY
Qty: 500 G | Refills: 11 | Status: SHIPPED | OUTPATIENT
Start: 2017-03-21

## 2017-03-21 NOTE — PROGRESS NOTES
Chief Complaint   Patient presents with   • Constipation       Festus Mcneill is a  76 y.o. male here for a follow up visit for chronic constipation.    HPI     Patient with chronic idiopathic constipation tried adjusting dose of Amitiza.  Patient reports even just to 8 µg tablets daily were too much and cause diarrhea and incontinence.  Patient stopped medication after 4 days.  Patient reports did a couple of days of MiraLAX to good effect then stopped and it continued to give normal stools for several more days though now 1 day has not gone to the bathroom.  Patient denies abdominal pain nausea vomiting no melena or bright red blood per rectum.    Past Medical History   Diagnosis Date   • Arthritis    • Diverticulitis    • Diverticulosis    • GERD (gastroesophageal reflux disease)    • Hypertension    • Internal hemorrhoids    • Small bowel obstruction    • Vocal cord disease      VOCAL CORD TUMOR - CUT NERVE, NOW SPEAKS WITH HOARSENESS         Current Outpatient Prescriptions:   •  esomeprazole (NexIUM) 40 MG capsule, Take 40 mg by mouth every morning before breakfast., Disp: , Rfl:   •  hydrochlorothiazide (HYDRODIURIL) 25 MG tablet, Take 25 mg by mouth daily., Disp: , Rfl:   •  meloxicam (MOBIC) 15 MG tablet, Take 15 mg by mouth daily., Disp: , Rfl:   •  Multiple Vitamin (MULTI VITAMIN DAILY PO), Take  by mouth., Disp: , Rfl:   •  hyoscyamine (LEVSIN) 0.125 MG SL tablet, Take 1 tablet by mouth 4 (four) times a day with meals and nightly., Disp: 120 tablet, Rfl: 5  •  polyethylene glycol (MIRALAX) powder, Take 9 g by mouth Daily., Disp: 500 g, Rfl: 11    Allergies   Allergen Reactions   • Ciprofloxacin    • Penicillins        Social History     Social History   • Marital status:      Spouse name: N/A   • Number of children: N/A   • Years of education: N/A     Occupational History   • Not on file.     Social History Main Topics   • Smoking status: Never Smoker   • Smokeless tobacco: Not on file   •  Alcohol use No   • Drug use: No   • Sexual activity: Not on file     Other Topics Concern   • Not on file     Social History Narrative       Family History   Problem Relation Age of Onset   • Colon cancer Mother        Review of Systems   Constitutional: Negative.    HENT: Positive for voice change.    Respiratory: Negative.    Cardiovascular: Negative.    Gastrointestinal: Positive for constipation. Negative for abdominal distention, abdominal pain, blood in stool and rectal pain.   Musculoskeletal: Positive for back pain and gait problem.   Skin: Negative.        Vitals:    03/21/17 1439   BP: 124/80       Physical Exam   Constitutional: He is oriented to person, place, and time. He appears well-developed and well-nourished.   HENT:   Head: Normocephalic and atraumatic.   Eyes: EOM are normal. Pupils are equal, round, and reactive to light. No scleral icterus.   Cardiovascular: Normal rate, regular rhythm and normal heart sounds.  Exam reveals no gallop and no friction rub.    No murmur heard.  Pulmonary/Chest: Effort normal. He has no wheezes. He has no rales.   Abdominal: Soft. Bowel sounds are normal. He exhibits no distension and no mass. There is no tenderness. No hernia.   Musculoskeletal: Normal range of motion.   Neurological: He is alert and oriented to person, place, and time.   Skin: Skin is dry.   Psychiatric: He has a normal mood and affect. His behavior is normal.       No visits with results within 2 Month(s) from this visit.  Latest known visit with results is:    Admission on 05/05/2015, Discharged on 05/19/2015   No results displayed because visit has over 200 results.          Festus was seen today for constipation.    Diagnoses and all orders for this visit:    Chronic idiopathic constipation    Other orders  -     polyethylene glycol (MIRALAX) powder; Take 9 g by mouth Daily.      Patient with chronic idiopathic constipation failed on Amitiza due to issue of strength.  Patient reports one  pill of 8 µg daily was not enough to have him have a bowel movement but to a day caused persistent diarrhea and incontinence.  Since stopping the Amitiza patient has tried several days of MiraLAX to good effect but actually only took 2 days and then went well for several days now 3 days later has not had a bowel movement yet.  At this point would like to go back on a regular course of MiraLAX and try a half dose daily.  Patient instructed to make one glass worth, drinking half but the other half in the fridge and then drink the rest the next day.  We'll monitor his clinical response and adjust dosing as needed.

## (undated) DEVICE — Device: Brand: DEFENDO AIR/WATER/SUCTION AND BIOPSY VALVE

## (undated) DEVICE — TUBING, SUCTION, 1/4" X 10', STRAIGHT: Brand: MEDLINE

## (undated) DEVICE — THE TORRENT IRRIGATION SCOPE CONNECTOR IS USED WITH THE TORRENT IRRIGATION TUBING TO PROVIDE IRRIGATION FLUIDS SUCH AS STERILE WATER DURING GASTROINTESTINAL ENDOSCOPIC PROCEDURES WHEN USED IN CONJUNCTION WITH AN IRRIGATION PUMP (OR ELECTROSURGICAL UNIT).: Brand: TORRENT

## (undated) DEVICE — CANN NASL CO2 TRULINK W/O2 A/